# Patient Record
Sex: MALE | Race: WHITE | Employment: STUDENT | URBAN - METROPOLITAN AREA
[De-identification: names, ages, dates, MRNs, and addresses within clinical notes are randomized per-mention and may not be internally consistent; named-entity substitution may affect disease eponyms.]

---
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
## 2020-01-30 ENCOUNTER — OFFICE VISIT (OUTPATIENT)
Dept: SURGERY | Facility: CLINIC | Age: 20
End: 2020-01-30
Payer: MEDICAID

## 2020-01-30 VITALS — WEIGHT: 165 LBS | BODY MASS INDEX: 27 KG/M2

## 2020-01-30 DIAGNOSIS — T81.89XD NON-HEALING SURGICAL WOUND, SUBSEQUENT ENCOUNTER: Primary | ICD-10-CM

## 2020-01-30 PROCEDURE — 99212 OFFICE O/P EST SF 10 MIN: CPT | Performed by: SURGERY

## 2020-02-02 NOTE — PROGRESS NOTES
Patient presents with:  Cyst: Cysts on right buttock, had surgery 8/17/2019 on 5 cysts, but these two never went away. They fill with pus, become painful, then drain. Denies fevers. They are red and scabbed at this time    Wt 165 lb (74.8 kg)   BMI 27. 4

## 2020-02-14 ENCOUNTER — OFFICE VISIT (OUTPATIENT)
Dept: DERMATOLOGY CLINIC | Facility: CLINIC | Age: 20
End: 2020-02-14
Payer: MEDICAID

## 2020-02-14 ENCOUNTER — OFFICE VISIT (OUTPATIENT)
Dept: FAMILY MEDICINE CLINIC | Facility: CLINIC | Age: 20
End: 2020-02-14
Payer: MEDICAID

## 2020-02-14 VITALS
WEIGHT: 165 LBS | HEIGHT: 65 IN | DIASTOLIC BLOOD PRESSURE: 72 MMHG | HEART RATE: 59 BPM | TEMPERATURE: 98 F | SYSTOLIC BLOOD PRESSURE: 116 MMHG | BODY MASS INDEX: 27.49 KG/M2

## 2020-02-14 DIAGNOSIS — L70.0 ACNE VULGARIS: Primary | ICD-10-CM

## 2020-02-14 PROCEDURE — 99213 OFFICE O/P EST LOW 20 MIN: CPT | Performed by: FAMILY MEDICINE

## 2020-02-14 PROCEDURE — 99213 OFFICE O/P EST LOW 20 MIN: CPT | Performed by: DERMATOLOGY

## 2020-02-14 RX ORDER — MINOCYCLINE HYDROCHLORIDE 100 MG/1
100 CAPSULE ORAL 2 TIMES DAILY
Qty: 60 CAPSULE | Refills: 12 | Status: SHIPPED | OUTPATIENT
Start: 2020-02-14

## 2020-02-14 RX ORDER — TAZAROTENE 1 MG/G
CREAM TOPICAL
Qty: 60 G | Refills: 2 | Status: SHIPPED | OUTPATIENT
Start: 2020-02-14

## 2020-02-14 NOTE — PROGRESS NOTES
HPI:    Patient ID: Kirsten Fenton is a 23year old male. Pustular acne getting worse especially on the face      Review of Systems   Constitutional: Negative. Respiratory: Negative. Cardiovascular: Negative.     Skin:        See note

## 2020-02-17 ENCOUNTER — TELEPHONE (OUTPATIENT)
Dept: DERMATOLOGY CLINIC | Facility: CLINIC | Age: 20
End: 2020-02-17

## 2020-02-17 NOTE — TELEPHONE ENCOUNTER
Fax from [address] [facility] required for Tazarotene (TAZORAC) 0.1 % External Cream    Placed fax in pa inbox- [phone]

## 2020-02-24 NOTE — PROGRESS NOTES
Mohan Bland is a 23year old male. Patient presents with:  Acne: pt c/o longstind acne--worsening. used Curology without improvement.              Ibuprofen  Current Outpatient Medications   Medication Sig Dispense Refill   • Minocycline HCl 100 Worry: Not on file        Inability: Not on file      Transportation needs:        Medical: Not on file        Non-medical: Not on file    Tobacco Use      Smoking status: Never Smoker      Smokeless tobacco: Never Used    Substance and Sexual Activity lymph node swelling. No other skin complaints. History, medications, allergies as noted. History con't :   Seen for acne, complaints above. No sports or external factors aggravating. Denies GI upset, photosensitivity. No other systemic complaints. (TAZORAC) 0.1 % External Cream 60 g 2     Sig: Use 2 times weekly x 1 week then every other night  For acne       Acne vulgaris  (primary encounter diagnosis)    No orders of the defined types were placed in this encounter.       Results From Past 48 Hours:

## 2020-02-25 NOTE — TELEPHONE ENCOUNTER
Please try pa-- this is for mod-severe acne--he has po's and had topicals in the past without benefit

## 2020-03-03 ENCOUNTER — OFFICE VISIT (OUTPATIENT)
Dept: SURGERY | Facility: CLINIC | Age: 20
End: 2020-03-03
Payer: MEDICAID

## 2020-03-03 VITALS — BODY MASS INDEX: 27 KG/M2 | WEIGHT: 165 LBS

## 2020-03-03 DIAGNOSIS — Z09 POSTOPERATIVE EXAMINATION: Primary | ICD-10-CM

## 2020-03-03 PROCEDURE — 99212 OFFICE O/P EST SF 10 MIN: CPT | Performed by: SURGERY

## 2020-03-03 NOTE — PROGRESS NOTES
Patient presents with: Follow - Up: pilonidal cystectomy 8/2019. Patient states he is healed, no drainage, no pain, denies fevers. Wt 165 lb (74.8 kg)   BMI 27.46 kg/m²   Gen:  NAD  Gluteal crease wounds healed. AP:  Doing well sp Gips procedure.

## 2020-03-05 NOTE — TELEPHONE ENCOUNTER
Denied. Tazorac .1% cream    \" you must have tried and had a poor response to three preferred drugs that are used to treat your condition. You have tried two. You must try one more drug for your condition. \"     Preferred drug is erythromycin/BPO 5-3% gel

## 2020-03-06 NOTE — TELEPHONE ENCOUNTER
Please let pt know--If he has tretinoin that might still be ok, that might go through. With this insurance it is possible nothing will go through topically.   He does have po's

## 2021-02-13 ENCOUNTER — HOSPITAL ENCOUNTER (OUTPATIENT)
Age: 21
Discharge: HOME | End: 2021-02-13
Payer: MEDICAID

## 2021-02-13 VITALS
BODY MASS INDEX: 25.77 KG/M2 | WEIGHT: 180 LBS | HEART RATE: 68 BPM | DIASTOLIC BLOOD PRESSURE: 65 MMHG | TEMPERATURE: 99 F | HEIGHT: 70 IN | RESPIRATION RATE: 18 BRPM | OXYGEN SATURATION: 100 % | SYSTOLIC BLOOD PRESSURE: 131 MMHG

## 2021-02-13 DIAGNOSIS — H61.21 IMPACTED CERUMEN OF RIGHT EAR: Primary | ICD-10-CM

## 2021-02-13 PROCEDURE — 69209 REMOVE IMPACTED EAR WAX UNI: CPT | Performed by: NURSE PRACTITIONER

## 2021-02-13 PROCEDURE — 99213 OFFICE O/P EST LOW 20 MIN: CPT | Performed by: NURSE PRACTITIONER

## 2021-02-13 NOTE — ED PROVIDER NOTES
Patient Seen in: [facility]      History   Patient presents with:  Ear Problem Pain    Stated Complaint: check right ear    HPI/Subjective:   HPI    This is a well-appearing 22-year-old who presents with a chief complaint of right ear fullnes External ear normal. There is impacted cerumen. Left Ear: Tympanic membrane, ear canal and external ear normal.      Nose: Nose normal.      Mouth/Throat:      Mouth: Mucous membranes are moist.      Pharynx: Oropharynx is clear. Uvula midline.    Eyes

## 2021-02-13 NOTE — ED INITIAL ASSESSMENT (HPI)
Pt was using a qtip today and may have pushed the earwax further in his right ear.  +ear fullness and no pain.

## 2021-06-16 ENCOUNTER — HOSPITAL ENCOUNTER (OUTPATIENT)
Age: 21
Discharge: HOME | End: 2021-06-16
Payer: MEDICAID

## 2021-06-16 VITALS
HEART RATE: 76 BPM | TEMPERATURE: 98 F | HEIGHT: 70 IN | BODY MASS INDEX: 26.05 KG/M2 | RESPIRATION RATE: 16 BRPM | OXYGEN SATURATION: 100 % | DIASTOLIC BLOOD PRESSURE: 63 MMHG | WEIGHT: 182 LBS | SYSTOLIC BLOOD PRESSURE: 115 MMHG

## 2021-06-16 DIAGNOSIS — H61.21 IMPACTED CERUMEN OF RIGHT EAR: Primary | ICD-10-CM

## 2021-06-16 PROCEDURE — 99213 OFFICE O/P EST LOW 20 MIN: CPT | Performed by: NURSE PRACTITIONER

## 2021-06-16 PROCEDURE — 69209 REMOVE IMPACTED EAR WAX UNI: CPT | Performed by: NURSE PRACTITIONER

## 2021-06-16 NOTE — ED PROVIDER NOTES
Patient Seen in: [facility]      History   Patient presents with:  Cerumen Impaction    Stated Complaint: Blocked rt ear    HPI/Subjective:   Ivory Fossa is a 21year-old male presenting to the Immediate Care with cerumen impaction to ri Temp src Temporal   SpO2 100 %   O2 Device None (Room air)       Current:/63   Pulse 76   Temp 98 °F (36.7 °C) (Temporal)   Resp 16   Ht 177.8 cm (5' 10\")   Wt 82.6 kg   SpO2 100%   BMI 26.11 kg/m²         Physical Exam  Vitals and nursing note re successful removal of cerumen. Patient tolerated well. No evidence of underlying OM/OE/ruptured TM. Patient stable for discharge. Advised patient not to use Qtips to clean ears. Return precautions discussed.      Patient is afebrile, nontoxic in appearance

## 2021-06-16 NOTE — ED INITIAL ASSESSMENT (HPI)
Right ear  with muffled hearing that started today after using a q-tip to clean his ear  No pain  +liquid drainage unknown foul odor

## 2021-12-18 ENCOUNTER — OFFICE VISIT (OUTPATIENT)
Dept: FAMILY MEDICINE CLINIC | Facility: CLINIC | Age: 21
End: 2021-12-18
Payer: MEDICAID

## 2021-12-18 VITALS
SYSTOLIC BLOOD PRESSURE: 125 MMHG | WEIGHT: 182 LBS | HEART RATE: 78 BPM | BODY MASS INDEX: 26.05 KG/M2 | HEIGHT: 70 IN | DIASTOLIC BLOOD PRESSURE: 79 MMHG

## 2021-12-18 DIAGNOSIS — Z86.19 HISTORY OF POSITIVE TEST FOR HERPES SIMPLEX VIRUS TYPE 1 BY PCR: Primary | ICD-10-CM

## 2021-12-18 PROCEDURE — 3078F DIAST BP <80 MM HG: CPT | Performed by: PHYSICIAN ASSISTANT

## 2021-12-18 PROCEDURE — 3074F SYST BP LT 130 MM HG: CPT | Performed by: PHYSICIAN ASSISTANT

## 2021-12-18 PROCEDURE — 3008F BODY MASS INDEX DOCD: CPT | Performed by: PHYSICIAN ASSISTANT

## 2021-12-18 PROCEDURE — 99213 OFFICE O/P EST LOW 20 MIN: CPT | Performed by: PHYSICIAN ASSISTANT

## 2021-12-18 NOTE — PROGRESS NOTES
HPI:   HPI   24year-old male is here in the office for lab result. Patient is doing fine at this time. Patient had STD results done on 12/16/21. Patient denies of chest pain, SOB, N/V/C/D, fever, dizziness, syncope, abdominal pain, penile discharge/pain. Socioeconomic History      Marital status: Single      Spouse name: Not on file      Number of children: 0      Years of education: Not on file      Highest education level: Not on file    Occupational History      Occupation: Freshman in [employer] well-developed. HENT:      Head: Normocephalic and atraumatic. Right Ear: External ear normal.      Left Ear: External ear normal.      Nose: Nose normal.   Eyes:      General:         Right eye: No discharge. Left eye: No discharge.       Co

## 2022-03-01 ENCOUNTER — OFFICE VISIT (OUTPATIENT)
Dept: FAMILY MEDICINE CLINIC | Facility: CLINIC | Age: 22
End: 2022-03-01
Payer: MEDICAID

## 2022-03-01 VITALS
HEIGHT: 70 IN | HEART RATE: 71 BPM | WEIGHT: 186 LBS | DIASTOLIC BLOOD PRESSURE: 71 MMHG | BODY MASS INDEX: 26.63 KG/M2 | SYSTOLIC BLOOD PRESSURE: 129 MMHG

## 2022-03-01 DIAGNOSIS — Z11.3 SCREENING EXAMINATION FOR STD (SEXUALLY TRANSMITTED DISEASE): Primary | ICD-10-CM

## 2022-03-01 PROCEDURE — 3074F SYST BP LT 130 MM HG: CPT | Performed by: PHYSICIAN ASSISTANT

## 2022-03-01 PROCEDURE — 3078F DIAST BP <80 MM HG: CPT | Performed by: PHYSICIAN ASSISTANT

## 2022-03-01 PROCEDURE — 3008F BODY MASS INDEX DOCD: CPT | Performed by: PHYSICIAN ASSISTANT

## 2022-03-01 PROCEDURE — 99213 OFFICE O/P EST LOW 20 MIN: CPT | Performed by: PHYSICIAN ASSISTANT

## 2022-03-03 LAB
CHLAMYDIA TRACHOMATIS$RNA, TMA: NOT DETECTED
HSV 1 IGG TYPE SPECIFIC$AB: 14.3 INDEX
HSV 2 IGG TYPE SPECIFIC$AB: <0.9 INDEX
NEISSERIA GONORRHOEAE$RNA, TMA: NOT DETECTED
VARICELLA ZOSTER VIRUS$AB (IGG): 937.9 INDEX

## 2022-12-01 ENCOUNTER — HOSPITAL ENCOUNTER (EMERGENCY)
Facility: HOSPITAL | Age: 22
Discharge: HOME | End: 2022-12-01
Attending: STUDENT IN AN ORGANIZED HEALTH CARE EDUCATION/TRAINING PROGRAM
Payer: MEDICAID

## 2022-12-01 VITALS
OXYGEN SATURATION: 100 % | BODY MASS INDEX: 27.2 KG/M2 | RESPIRATION RATE: 17 BRPM | HEART RATE: 80 BPM | DIASTOLIC BLOOD PRESSURE: 93 MMHG | WEIGHT: 190 LBS | TEMPERATURE: 98 F | SYSTOLIC BLOOD PRESSURE: 135 MMHG | HEIGHT: 70 IN

## 2022-12-01 DIAGNOSIS — J02.0 STREP PHARYNGITIS: Primary | ICD-10-CM

## 2022-12-01 LAB — S PYO AG THROAT QL: POSITIVE

## 2022-12-01 PROCEDURE — 87880 STREP A ASSAY W/OPTIC: CPT

## 2022-12-01 PROCEDURE — 99283 EMERGENCY DEPT VISIT LOW MDM: CPT

## 2022-12-01 RX ORDER — AMOXICILLIN 500 MG/1
500 TABLET, FILM COATED ORAL 2 TIMES DAILY
Qty: 20 TABLET | Refills: 0 | Status: SHIPPED | OUTPATIENT
Start: 2022-12-01 | End: 2022-12-11

## 2022-12-01 NOTE — ED INITIAL ASSESSMENT (HPI)
Cecile Blackwell arrived through triage for c/o swollen uvula. States last night he was experiencing an itchy throat and then awoke this AM with the swelling. Normal tone of voice. Managing secretions without difficulty. Aside from some nasal congestion yesterday, he was otherwise feeling well.

## 2023-04-06 NOTE — PROGRESS NOTES
Diagnosis: Sprain of anterior talofibular ligament of right ankle, initial encounter (X01.383B), Sprain of anterior talofibular ligament of right ankle, initial encounter (L24.873L)        # of Visits:  2/6 [facility] (exp 3/24)         Next MD visit: no (2) more than 100 beats/min

## 2024-02-03 ENCOUNTER — HOSPITAL ENCOUNTER (OUTPATIENT)
Age: 24
Discharge: HOME | End: 2024-02-03
Payer: MEDICAID

## 2024-02-03 VITALS
DIASTOLIC BLOOD PRESSURE: 63 MMHG | HEART RATE: 66 BPM | SYSTOLIC BLOOD PRESSURE: 111 MMHG | OXYGEN SATURATION: 97 % | TEMPERATURE: 98 F | RESPIRATION RATE: 16 BRPM

## 2024-02-03 DIAGNOSIS — H61.21 IMPACTED CERUMEN OF RIGHT EAR: Primary | ICD-10-CM

## 2024-02-03 PROCEDURE — 69210 REMOVE IMPACTED EAR WAX UNI: CPT | Performed by: PHYSICIAN ASSISTANT

## 2024-02-03 PROCEDURE — 99213 OFFICE O/P EST LOW 20 MIN: CPT | Performed by: PHYSICIAN ASSISTANT

## 2024-02-03 NOTE — ED PROVIDER NOTES
Patient Seen in: [facility]    History     Chief Complaint   Patient presents with    Ear Problem Pain     Stated Complaint: Ear Pain    HPI    Garfield Ellison is a 23 year old male presents with chief complaint of right earache.  Onset this morning.  Patient reports associated sensation of right ear being clogged.  Patient denies fever, chills, otorrhea, nasal drainage, sore throat, abdominal pain, nausea, vomiting, diarrhea, constipation, rash, neck pain, neck swelling, restricted neck movement, cough, dyspnea.      Past Medical History:   Diagnosis Date    Acne     Other ill-defined conditions(799.89)     per ng neck bx couple yr ago-neg    Visual impairment     eyeglasses       Past Surgical History:   Procedure Laterality Date    BIOPSY      neck    OTHER SURGICAL HISTORY  08/16/2019    pilonidal cystectomy    REMV PILONIDAL LESION SIMPLE N/A 08/2019            Family History   Problem Relation Age of Onset    Hypertension Father     Heart Attack Other     No Known Problems Mother     Cancer Neg     Diabetes Neg        Social History     Socioeconomic History    Marital status: Single    Number of children: 0   Occupational History    Occupation: Freshman in College   Tobacco Use    Smoking status: Never    Smokeless tobacco: Never   Vaping Use    Vaping Use: Never used   Substance and Sexual Activity    Alcohol use: No     Alcohol/week: 0.0 standard drinks of alcohol    Drug use: No   Other Topics Concern    Reaction to local anesthetic No       Review of Systems    Positive for stated complaint: Ear Pain  Other systems are as noted in HPI.  Constitutional and vital signs reviewed.      All other systems reviewed and negative except as noted above.    PSFH elements reviewed from today and agreed except as otherwise stated in HPI.    Physical Exam     ED Triage Vitals [02/03/24 1324]   /63   Pulse 66   Resp 16   Temp 97.8 °F (36.6 °C)   Temp src Temporal   SpO2 97 %   O2 Device None (Room  air)       Current:/63   Pulse 66   Temp 97.8 °F (36.6 °C) (Temporal)   Resp 16   SpO2 97%     PULSE OX within normal limits on room air as interpreted by this provider.     Constitutional: The patient is cooperative. Appears well-developed and well-nourished.  No acute distress.  Psychological: Alert, No abnormalities of mood, affect.  Head: Normocephalic/atraumatic.    Eyes: Pupils are equal round reactive to light.  Conjunctiva are within normal limits.  ENT: Positive cerumen impaction of right auditory canal.  Upon cerumen removal-bilateral TMs and auditory canals within normal limits bilaterally.  No post auricular tenderness, adenopathy or erythema.  No otorrhea.  Pharynx nonjected.  Tonsils within normal size limits bilaterally.  No tonsillar exudates.  Mucous membranes moist.  Neck: The neck is supple.  No meningeal signs.  Respiratory: Respiratory effort was normal.  There is no stridor.  Air entry is equal.  Cardiovascular: Regular rate and rhythm.  Capillary refill is brisk.    Genitourinary: Not examined.  Lymphatic: No gross lymphadenopathy noted.  Musculoskeletal: Musculoskeletal system is grossly intact.  There is no obvious deformity.  Neurological: Gross motor movement is intact in all 4 extremities.  Patient exhibits normal speech.  Skin: Skin is normal to inspection.  Warm and dry.  No obvious bruising.  No obvious rash.        ED Course   Labs Reviewed - No data to display    PROCEDURE: Portion of right cerumen impaction removed via irrigation.  Remainder of right cerumen impaction removed with ear curette by this provider.  Patient tolerated procedure well without complication.  MDM     Differential diagnosis including but not limited to cerumen impaction, otitis media, otitis externa    Physical exam remained stable over serial reexaminations as previously documented.  Physical exam findings discussed with patient.  Patient states pain has resolved prior to discharge.    I have given  the patient instructions regarding their diagnoses, expectations, follow up, and ER precautions. I explained to the patient that emergent conditions may arise and to go to the ER for new, worsening or any persistent conditions. I've explained the importance of following up with their doctor as instructed. The patient verbalized understanding of the discharge instructions and plan.      Disposition and Plan     Clinical Impression:  1. Impacted cerumen of right ear        Disposition:  Discharge    Follow-up:  Jaky Abrams MD  [address] [place] [id number] [phone]    Call in 1 day  For follow-up      Medications Prescribed:  There are no discharge medications for this patient.

## 2024-09-29 ENCOUNTER — HOSPITAL ENCOUNTER (OUTPATIENT)
Age: 24
Discharge: HOME | End: 2024-09-29
Payer: MEDICAID

## 2024-09-29 VITALS
OXYGEN SATURATION: 100 % | DIASTOLIC BLOOD PRESSURE: 82 MMHG | SYSTOLIC BLOOD PRESSURE: 136 MMHG | TEMPERATURE: 98 F | RESPIRATION RATE: 18 BRPM | HEART RATE: 96 BPM

## 2024-09-29 DIAGNOSIS — H61.21 IMPACTED CERUMEN OF RIGHT EAR: Primary | ICD-10-CM

## 2024-09-29 PROCEDURE — 99213 OFFICE O/P EST LOW 20 MIN: CPT | Performed by: NURSE PRACTITIONER

## 2024-09-29 NOTE — ED PROVIDER NOTES
Patient Seen in: [facility]      History     Chief Complaint   Patient presents with    Ear Problem     Stated Complaint: Ear Pain  Subjective:   HPI    This is a well appearing 22 y/o who presents for a chief complaint of R ear feeling clogged. Pt reports hx of cerumen impaction with similar symptoms. No pain, no drainage.     Objective:   Past Medical History:    Acne    Other ill-defined conditions(799.89)    per ng neck bx couple yr ago-neg    Visual impairment    eyeglasses            Past Surgical History:   Procedure Laterality Date    Biopsy      neck    Other surgical history  08/16/2019    pilonidal cystectomy    Remv pilonidal lesion simple N/A 08/2019              Social History     Socioeconomic History    Marital status: Single    Number of children: 0   Occupational History    Occupation: Freshman in College   Tobacco Use    Smoking status: Never    Smokeless tobacco: Never   Vaping Use    Vaping status: Never Used   Substance and Sexual Activity    Alcohol use: No     Alcohol/week: 0.0 standard drinks of alcohol    Drug use: No   Other Topics Concern    Reaction to local anesthetic No            Review of Systems   All other systems reviewed and are negative.      Positive for stated complaint: Ear Problem    Other systems are as noted in HPI.  Constitutional and vital signs reviewed.      All other systems reviewed and negative except as noted above.    Physical Exam     ED Triage Vitals [09/29/24 1105]   /82   Pulse 96   Resp 18   Temp 98 °F (36.7 °C)   Temp src Temporal   SpO2 100 %   O2 Device None (Room air)     Current:/82   Pulse 96   Temp 98 °F (36.7 °C) (Temporal)   Resp 18   SpO2 100%     Physical Exam  Vitals and nursing note reviewed.   Constitutional:       General: He is awake. He is not in acute distress.     Appearance: Normal appearance. He is not ill-appearing, toxic-appearing or diaphoretic.   HENT:      Head: Normocephalic and atraumatic.      Right  Ear: There is impacted cerumen.      Left Ear: Tympanic membrane, ear canal and external ear normal.      Nose: Nose normal.      Mouth/Throat:      Mouth: Mucous membranes are moist.      Pharynx: Oropharynx is clear. Uvula midline.   Eyes:      General: Lids are normal.      Extraocular Movements: Extraocular movements intact.      Conjunctiva/sclera: Conjunctivae normal.      Pupils: Pupils are equal, round, and reactive to light.   Cardiovascular:      Rate and Rhythm: Normal rate and regular rhythm.      Pulses: Normal pulses.      Heart sounds: Normal heart sounds.   Pulmonary:      Effort: Pulmonary effort is normal.      Breath sounds: Normal breath sounds.   Skin:     General: Skin is warm and dry.      Capillary Refill: Capillary refill takes less than 2 seconds.   Neurological:      General: No focal deficit present.      Mental Status: He is alert and oriented to person, place, and time.   Psychiatric:         Mood and Affect: Mood normal.         Behavior: Behavior normal. Behavior is cooperative.         Thought Content: Thought content normal.         Judgment: Judgment normal.       ED Course   Ear irrigation and re-evaluate   Ear irrigation completed. Pt reports full resolution of symptoms. TM normal, ear canal normal.  No results found.  Labs Reviewed - No data to display    MDM     Medical Decision Making  Differential diagnoses reflecting the complexity of care include but are not limited to cerumen impaction, otitis media, otitis externa.    Comorbidities that add complexity to management include: N/A  History obtained by an independent source was from: N/A  Discussions of management was done with: N/A  My independent interpretations of studies include: N/A  Shared decision making was done by: Patient and Myself   Patient is well appearing, non-toxic and in no acute distress.  Vital signs are stable.  Full resolution of symptoms after ear irrigation.       Problems Addressed:  Impacted cerumen  of right ear: acute illness or injury    Amount and/or Complexity of Data Reviewed  ECG/medicine tests: ordered and independent interpretation performed. Decision-making details documented in ED Course.        Disposition and Plan     Clinical Impression:  1. Impacted cerumen of right ear         Disposition:  Discharge  9/29/2024 11:22 am    Follow-up:  Jaky Abrams MD  [address] [place] [id number]  [phone]                Medications Prescribed:  There are no discharge medications for this patient.         Note to patient: The 21st Century cares act makes medical notes like these available to patients in the interest of transparency.  However, be advised this medical document and is intended as peer to peer communication.  It is read the medical language and may contain abbreviations or verbiage that are unfamiliar.  It may appear blunt or direct.  Medical documents are intended to carry relevant information, fax is evident and the clinical opinion of the practitioner.    This note was prepared using Dragon Medical voice recognition dictation software.  As a result, errors may occur.  When identified, these errors have been corrected.  While every attempt is made to correct errors during dictation, discrepancies may still exist.    Yandy Mcnally, APRN  9/29/2024  11:34 AM

## 2025-01-29 ENCOUNTER — HOSPITAL ENCOUNTER (OUTPATIENT)
Age: 25
Discharge: HOME | End: 2025-01-29
Payer: MEDICAID

## 2025-01-29 VITALS
OXYGEN SATURATION: 97 % | RESPIRATION RATE: 18 BRPM | HEART RATE: 70 BPM | DIASTOLIC BLOOD PRESSURE: 69 MMHG | TEMPERATURE: 99 F | SYSTOLIC BLOOD PRESSURE: 113 MMHG

## 2025-01-29 DIAGNOSIS — H61.21 RIGHT EAR IMPACTED CERUMEN: Primary | ICD-10-CM

## 2025-01-29 PROCEDURE — 69209 REMOVE IMPACTED EAR WAX UNI: CPT | Performed by: NURSE PRACTITIONER

## 2025-01-29 NOTE — ED PROVIDER NOTES
Chief Complaint   Patient presents with    Ear Problem       HPI:     Garfield Ellison is a 24 year old male who presents with a chief complaint of right ear fullness that started today.  No fever.  No URI symptoms.  No pain in the ear.    PFSH  PFS asessment screens reviewed and agree.  Nursing note reviewed and I agree with documentation.    Family History   Problem Relation Age of Onset    Hypertension Father     Heart Attack Other     No Known Problems Mother     Cancer Neg     Diabetes Neg      Family history reviewed with patient/caregiver and is not pertinent to presenting problem.  Social History     Socioeconomic History    Marital status: Single     Spouse name: Not on file    Number of children: 0    Years of education: Not on file    Highest education level: Not on file   Occupational History    Occupation: Freshman in College   Tobacco Use    Smoking status: Never    Smokeless tobacco: Never   Vaping Use    Vaping status: Never Used   Substance and Sexual Activity    Alcohol use: No     Alcohol/week: 0.0 standard drinks of alcohol    Drug use: No    Sexual activity: Not on file   Other Topics Concern    Grew up on a farm Not Asked    History of tanning Not Asked    Outdoor occupation Not Asked    Pt has a pacemaker Not Asked    Pt has a defibrillator Not Asked    Reaction to local anesthetic No   Social History Narrative    Not on file     Social Drivers of Health     Financial Resource Strain: Not on file   Food Insecurity: Not on file   Transportation Needs: Not on file   Physical Activity: Not on file   Stress: Not on file   Social Connections: Not on file   Housing Stability: Not on file         ROS:   Positive for stated complaint: ear problem  All other systems reviewed and negative except as noted above.  Constitutional and Vital Signs Reviewed.      Physical Exam:     Findings:    /69   Pulse 70   Temp 98.9 °F (37.2 °C) (Oral)   Resp 18   SpO2 97%   GENERAL: well developed, well  nourished, well hydrated, no distress  EARS: cerumen impacted  right. Left TM and external canal clear.      MDM/Assessment/Plan:   Orders for this encounter:    Orders Placed This Encounter    Ear wax removal (Irrigation)     Ear irrigation on right       Labs performed this visit:  No results found for this or any previous visit (from the past 10 hours).    MDM:  Medical Decision Making  Differentials considered: Otitis media versus otitis externa versus cerumen impaction versus other    HPI and exam consistent with cerumen impaction on right. The ear was irrigated and is clear post irrigation. No signs of otitis media or otitis externa. I advised patient to avoid Q-tips and/or ear buds. Patient was advised to return for fever, ear pain or any new/worsening symptoms. Patient verbalized understanding and agreeable to plan of care.         Diagnosis:    ICD-10-CM    1. Right ear impacted cerumen  H61.21           All results reviewed and discussed with patient.  See AVS for detailed discharge instructions for your condition today.    Follow Up with:  No follow-up provider specified.

## (undated) DEVICE — HYDROGEN PEROXIDE SOL 4 OZ

## (undated) DEVICE — Device

## (undated) DEVICE — CAUTERY BLADE 2IN INS E1455

## (undated) DEVICE — TRAY SKIN PREP PVP-1

## (undated) DEVICE — MINOR GENERAL: Brand: MEDLINE INDUSTRIES, INC.

## (undated) DEVICE — ABDOMINAL PAD: Brand: CURITY

## (undated) DEVICE — GAUZE PACKING IODOFORM 1/4X5

## (undated) DEVICE — DRAPE SHEET LAPAROTOMY

## (undated) DEVICE — MATERNITY KNIT PANTS,SEAMLESS: Brand: WINGS

## (undated) DEVICE — ENCORE® LATEX ACCLAIM SIZE 8, STERILE LATEX POWDER-FREE SURGICAL GLOVE: Brand: ENCORE

## (undated) NOTE — LETTER
1/30/2020          To Whom It May Concern:    Mohan Bland is currently under my medical care and saw me today in my office. He may return to work on 1/30/2020. Activity is restricted as follows: None.   If you require additional information please

## (undated) NOTE — LETTER
9/12/2018              Conception Decree Ionikov        [address] [facility] [place] should not participate in football practice until next evaluation in 1 week.       Sincerely,    MD Nhi Nichols , [address]

## (undated) NOTE — ED AVS SNAPSHOT
[facility] AND [facility] in [facility] [address]  [address] [facility]    Phone:  [phone]    Fax:  [phone]           Patti Glaser   MRN: [id number]    Department:  [facility] in [address] [place] [address]   Date of Visit: Insurance plans vary and the physician(s) referred by the Immediate Care may not be covered by your plan. It is possible that the physician may not participate in your health insurance plan.   This may result in a lower benefit level being available to yo CARE PHYSICIAN AT ONCE OR GO TO THE EMERGENCY DEPARTMENT. If you have been prescribed any medication(s), please fill your prescription right away and begin taking the medication(s) as directed.   If you believe that any of the medications or instructions - If you don’t have insurance, [facility] has partnered with [facility] [employer] [facility] to help you get signed up for insurance coverage.   [employer] [facility] is a Federal Navigator program that can help with your Affordable Care Act cover

## (undated) NOTE — LETTER
Name:  Trisha Solomon Year:  12th Grade Class: Student ID No.:   Address:  [address] [place] [address] Phone:  [phone] (home)  : / 16year old   Name Relationship [address] Work Phone Home Phone Mobile Phone   1.  PAMELA SHULTZ syndrome, short QT syndrome, Brugada syndrome, or catecholaminergic polymorphic ventricular tachycardia? No   15. Does anyone in your family have a heart problem, pacemaker, or implanted defibrillator? No   16.  Has anyone in your family had unexplained juan 39. Do you have a history of seizure disorder? No   37. Do you have headaches with exercise? No   38. Have you ever had numbness, tingling, or weakness in your arms or legs after being hit or falling? No   39. Have you ever been unable to move your arms / l MEDICAL NORMAL ABNORMAL FINDINGS   Appearance:  Marfan stigmata (kyphoscoliosis, high-arched palate, pectus excavatum,      arachnodactyly, arm span > height, hyperlaxity, myopia, MVP, aortic insufficiency) Yes    Eyes/Ears/Nose/Throat:    · Pupils equal that I/our student will not use performance-enhancing substances as defined in the [facility] Performance-Enhancing Substance Testing Program Protocol.  We have reviewed the policy and understand that I/our student may be asked to submit to testing for the presen

## (undated) NOTE — MR AVS SNAPSHOT
[facility] [place] [address] [address] [id number] [phone]               Thank you for choosing us for your health care visit with Mindi Bush MD.  We are glad to serve you and happy to provide you with this summary of y [employer] access allows you to view health information for your child from their recent   visit, view other health information and more. To sign up or find more information on getting   Proxy Access to your child’s Get[employer] go to [email or web address]

## (undated) NOTE — ED AVS SNAPSHOT
Rai Reid   MRN: [id number]    Department:  [facility] Emergency Department   Date of Visit:  8/17/2019           Disclosure     Insurance plans vary and the physician(s) referred by the ER may not be covered by your plan.  Please contact CARE PHYSICIAN AT ONCE OR RETURN IMMEDIATELY TO THE EMERGENCY DEPARTMENT. If you have been prescribed any medication(s), please fill your prescription right away and begin taking the medication(s) as directed.   If you believe that any of the medications

## (undated) NOTE — LETTER
2/23/2018          To Whom It May Concern:    Roberto Coombs is currently under my medical care. Please excuse him from gym for 3 weeks. If you require additional information please contact our office.         Sincerely,    Mark Liu MD

## (undated) NOTE — ED AVS SNAPSHOT
Suzie Wood   MRN: [id number]    Department:  [facility] Emergency Department   Date of Visit:  1/11/2018           Disclosure     Insurance plans vary and the physician(s) referred by the ER may not be covered by your plan.  Please contact CARE PHYSICIAN AT ONCE OR RETURN IMMEDIATELY TO THE EMERGENCY DEPARTMENT. If you have been prescribed any medication(s), please fill your prescription right away and begin taking the medication(s) as directed.   If you believe that any of the medications

## (undated) NOTE — Clinical Note
2/15/2017          To Whom It May Concern:    Yessy Mcgill is currently under my medical care and may not return to school at this time. Please excuse Xochitl Luo for 2 days. He may return to school on 2/17/17. Activity is restricted as follows: none.

## (undated) NOTE — ED AVS SNAPSHOT
[facility] Emergency Department    [address]     [address] [place] [address]    Phone:  [phone]    Fax:  [phone]           Arroyopascual Wagner   MRN: [id number]    Department:  [facility] Emergency Department   Date of Visit:  2/1 Si tiene problemas para programar audie primitivo de seguimiento según lo indicado, llame al encargado de zan al [phone]. It is our goal to assure that you are completely satisfied with every aspect of your visit today.   In an effort to constantly impr list to your next doctor's appointment. Any imaging studies and labs completed today can be reviewed in your MyChart account. You may have had testing done that requires us to contact you. Please make sure we have your correct phone number on file. Sign up for [employer] access for your child. [employer] access allows you to view health information for your child from their recent   visit, view other health information and more.   To sign up or find more information on getting   Proxy Access to your child

## (undated) NOTE — LETTER
Date & Time: 9/8/2018, 10:12 PM  Patient: Yessy Mcgill  Encounter Provider(s):    Yousif Bird MD       To Whom It May Concern:    Angela Beaver was seen and treated in our department on 9/8/2018.  He should not participate in gym/sports until horace

## (undated) NOTE — MR AVS SNAPSHOT
[facility] SPECIAL[facility] - [place] [address] [address] [id number] [phone]               Thank you for choosing us for your health care visit with MIGUEL Roberts.   We are glad to serve you and happy to provide you with this summary of Grp A Strep Cult, Throat [E]    Complete by:  Feb 15, 2017 (Approximate)    Assoc Dx:  Acute pharyngitis, unspecified etiology [J02.9]                 MyChart     Sign up for [employer] access for your child.   [employer] access allows you to view health informa o creating a rainbow shopping list to find colorful fruits and vegetables  o go on a walking scavenger hunt through the neighborhood   o grow a family garden    In addition to 5, 4, 3, 2, 1 families can make small changes in their family routines to help e

## (undated) NOTE — LETTER
1/27/2018          To Whom It May Concern:    Vida Nova is currently under my medical care. Please excuse the patient from gym class until further notice as he has an ankle injury.       If you require additional information please contact our offic

## (undated) NOTE — LETTER
[facility], [address] [facility] [place]       08/13/19        Patient: Daria Bustamante   YOB: 2000   Date of Visit: 8/13/2019       Dear  Dr. Carol Ellison MD,      Thank you for referring Daria Bustamante to my practice.

## (undated) NOTE — MR AVS SNAPSHOT
[facility] - [place] [address] [address] [id number] [phone]               Thank you for choosing us for your health care visit with Anil Oakley MD.  We are glad to serve you and happy to provide you with this summary of yo Instructions and Information about Your Health     None      Allergies as of May 12, 2017     Ibuprofen     Other reaction(s): Hives                Today's Vital Signs     Pulse Temp    66 98 °F (36.7 °C) (Oral)    Height Weight    5' 8\" (1.727 m) (40 %*,

## (undated) NOTE — LETTER
[address]  [address] [facility]  Dept: [phone]  Dept Fax: [phone]  Loc: [phone]      January 28, 2017    Patient: Chung Carey   Date of Visit: 1/28/2017       To Whom It May Concern:    Iwona Cisneros

## (undated) NOTE — Clinical Note
Name:  Yovanny Alexia Year:  11th Grade Class: Student ID No.:   Address:  [address] [place] Phone:  [phone] (home)  : [phone] 12year old   Name Relationship [address] Work Phone Home Phone Mobile Phone   1Pop Perrin 15. Does anyone in your family have hypertrophic cardiomyopathy, Marfan syndrome, arrhythmogenic right ventricular cardiomyopathy, long QT syndrome, short QT syndrome, Brugada syndrome, or catecholaminergic polymorphic ventricular tachycardia?      15. Does 35. Have you ever had a hit or blow to the head that caused confusion, prolonged headache, or memory problems? 36. Do you have a history of seizure disorder?     37. Do you have headaches with exercise?      38. Have you ever had numbness, tingling, or MEDICAL NORMAL ABNORMAL FINDINGS   Appearance:  Marfan stigmata (kyphoscoliosis, high-arched palate, pectus excavatum,      arachnodactyly, arm span > height, hyperlaxity, myopia, MVP, aortic insufficiency) Yes    Eyes/Ears/Nose/Throat:  Pupils equal    He defined in the [facility] Performance-Enhancing Substance Testing Program Protocol.  We have reviewed the policy and understand that I/our student may be asked to submit to testing for the presence of performance-enhancing substances in my/his/her body either dur

## (undated) NOTE — ED AVS SNAPSHOT
[facility] Emergency Department    [address] [address]    Phone:  [phone]    Fax:  [phone]           Wilma Noble   MRN: [id number]    Department:  [facility] Emergency Department   Date of Visit:  2/1 and Class Registration line at [phone] or find a doctor online by visiting [email or web address].    IF THERE IS ANY CHANGE OR WORSENING OF YOUR CONDITION, CALL YOUR PRIMARY CARE PHYSICIAN AT ONCE OR RETURN IMMEDIATELY TO [address] [place] [address].     If

## (undated) NOTE — Clinical Note
No referring provider defined for this encounter. 06/19/2017        Patient: Zach Alatorre   YOB: 2000   Date of Visit: 6/19/2017       Dear  Dr. Fina Dawn MD,      Thank you for referring Zach Alatorre to my practice.

## (undated) NOTE — Clinical Note
June 20, 2017         Shanon Ramos MD  [address] [facility]      Patient: Roberto Coombs   YOB: 2000   Date of Visit: 6/19/2017       Dear  Dr. Dior Morrison MD,      Thank you for referring Roberto Coombs to [facility]

## (undated) NOTE — LETTER
9/18/2018              Maciej Ellison        [address] [facility]         Jeremy Nissen can go back to participate in sports without any restrictions. If you have any questions please contact me.        Sincerely,    Mel Monique

## (undated) NOTE — LETTER
[address]  [facility] [address]  Dept: [phone]  Dept Fax: [phone]  Loc: [phone]      February 18, 2018    Patient: John Chepe   Date of Visit: 2/18/2018       To Whom It May Concern:    D

## (undated) NOTE — LETTER
2/6/2018          To Whom It May Concern:    Daria Bustamante is currently under my medical care. Please excuse him from gym for 3 weeks. If you require additional information please contact our office.         Sincerely,    Maryse Jiang MD

## (undated) NOTE — ED AVS SNAPSHOT
Wilma Noble   MRN: [id number]    Department:  [facility] Emergency Department   Date of Visit:  1/7/2019           Disclosure     Insurance plans vary and the physician(s) referred by the ER may not be covered by your plan.  Please contact CARE PHYSICIAN AT ONCE OR RETURN IMMEDIATELY TO THE EMERGENCY DEPARTMENT. If you have been prescribed any medication(s), please fill your prescription right away and begin taking the medication(s) as directed.   If you believe that any of the medications

## (undated) NOTE — LETTER
VACCINE ADMINISTRATION RECORD  PARENT / GUARDIAN APPROVAL  Date: 2018  Vaccine administered to: Vida Nova     : 10/19/2000    MRN: [id number]    A copy of the appropriate Centers for Disease Control and Prevention Vaccine Information stateme

## (undated) NOTE — MR AVS SNAPSHOT
[place]  [address]  [phone]               Thank you for choosing us for your health care visit with Anne Marie Pelayo MD.  We are glad to serve you and happy to provide you with this summ Allergies as of Jun 19, 2017     Ibuprofen     Other reaction(s): Hives                Today's Vital Signs     BP Pulse    112/80 mmHg (25 %, Z = -0.67 / 85 %, Z = 1.06*) 91    Temp Height    98.5 °F (36.9 °C) (Oral) 5' 10\" (1.778 m) (66 %*, Z = 0.41)

## (undated) NOTE — ED AVS SNAPSHOT
Sascha Viera   MRN: [id number]    Department:  [facility] Emergency Department   Date of Visit:  9/8/2018           Disclosure     Insurance plans vary and the physician(s) referred by the ER may not be covered by your plan.  Please contact CARE PHYSICIAN AT ONCE OR RETURN IMMEDIATELY TO THE EMERGENCY DEPARTMENT. If you have been prescribed any medication(s), please fill your prescription right away and begin taking the medication(s) as directed.   If you believe that any of the medications